# Patient Record
Sex: FEMALE | Race: WHITE | Employment: FULL TIME | URBAN - NONMETROPOLITAN AREA
[De-identification: names, ages, dates, MRNs, and addresses within clinical notes are randomized per-mention and may not be internally consistent; named-entity substitution may affect disease eponyms.]

---

## 2017-08-08 ENCOUNTER — HOSPITAL ENCOUNTER (OUTPATIENT)
Dept: GENERAL RADIOLOGY | Age: 52
Discharge: HOME | End: 2017-08-08
Payer: COMMERCIAL

## 2017-08-08 ENCOUNTER — OFFICE VISIT (OUTPATIENT)
Dept: FAMILY MEDICINE CLINIC | Age: 52
End: 2017-08-08
Payer: COMMERCIAL

## 2017-08-08 VITALS
HEIGHT: 67 IN | WEIGHT: 128 LBS | BODY MASS INDEX: 20.09 KG/M2 | SYSTOLIC BLOOD PRESSURE: 100 MMHG | DIASTOLIC BLOOD PRESSURE: 80 MMHG

## 2017-08-08 DIAGNOSIS — G89.29 CHRONIC RIGHT-SIDED LOW BACK PAIN WITH RIGHT-SIDED SCIATICA: ICD-10-CM

## 2017-08-08 DIAGNOSIS — M54.41 CHRONIC RIGHT-SIDED LOW BACK PAIN WITH RIGHT-SIDED SCIATICA: Primary | ICD-10-CM

## 2017-08-08 DIAGNOSIS — M54.41 CHRONIC RIGHT-SIDED LOW BACK PAIN WITH RIGHT-SIDED SCIATICA: ICD-10-CM

## 2017-08-08 DIAGNOSIS — G89.29 CHRONIC RIGHT-SIDED LOW BACK PAIN WITH RIGHT-SIDED SCIATICA: Primary | ICD-10-CM

## 2017-08-08 PROCEDURE — 99213 OFFICE O/P EST LOW 20 MIN: CPT | Performed by: NURSE PRACTITIONER

## 2017-08-08 PROCEDURE — 72100 X-RAY EXAM L-S SPINE 2/3 VWS: CPT

## 2017-08-08 RX ORDER — METHYLPREDNISOLONE 4 MG/1
TABLET ORAL
Qty: 1 KIT | Refills: 0 | Status: SHIPPED | OUTPATIENT
Start: 2017-08-08 | End: 2017-08-14

## 2017-08-08 ASSESSMENT — ENCOUNTER SYMPTOMS
HEMOPTYSIS: 0
EYES NEGATIVE: 1
GASTROINTESTINAL NEGATIVE: 1
RESPIRATORY NEGATIVE: 1
BOWEL INCONTINENCE: 0
BACK PAIN: 1
COUGH: 0
ORTHOPNEA: 0
SPUTUM PRODUCTION: 0

## 2017-08-08 ASSESSMENT — PATIENT HEALTH QUESTIONNAIRE - PHQ9
2. FEELING DOWN, DEPRESSED OR HOPELESS: 0
SUM OF ALL RESPONSES TO PHQ QUESTIONS 1-9: 0
SUM OF ALL RESPONSES TO PHQ9 QUESTIONS 1 & 2: 0
1. LITTLE INTEREST OR PLEASURE IN DOING THINGS: 0

## 2022-03-17 DIAGNOSIS — D72.820 LYMPHOCYTOSIS: Primary | ICD-10-CM

## 2022-03-18 NOTE — PROGRESS NOTES
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 Hematology consultation is performed 3/21/2022. Patient was seen in urgent care 3/4/2022 for evaluation of sore on left hip. Patient reports not tolerating Bactrim as prescribed. WBC was reported at 26,000. She was prescribed Bactrim, unable to tolerate due to nausea. She was evaluated at [facility]) 3/5/2022 with the following findings:    Labs 3/5/2022:  · CBC: WBC: 17.8, Hgb: 15.3/ MCV: 96, Hct: 43.5, Platelets: 491, Absolute lymphocytes: 10.1, Absolute monocytes: 2.06, Eosinophils: 0.5  · CMP: Creatinine 0.7/GFR 92, total protein 8.5 (6.3-8.2), calcium 10.1  · Amylase: 90  · Lipase: 145, normal  · UA: Negative nitrite, trace blood  · COVID-19 screen: Negative    Noncontrast CT abdomen/pelvis 3/5/2022 at [facility]):  STATRAD:  · 2.5 x 1.8 x 0.7 cm region of skin thickening and subcutaneous stranding in the posterior tissues of the right gluteal region, nonspecific-possibly region of cellulitis  · Approximately 6 x 5 x 3 cm poorly defined hypodense region in the posterior right hepatic lobe by the dome. Possibly fatty infiltrated versus hypodense mass.   Consider follow-up evaluation with contrast-enhanced CT or MRI of liver  · Mild wall thickening throughout the rectum with adjacent fat stranding, suggestive of mild proctitis or colitis  · 2 mm nonobstructive left renal stone  · Scattered, punctate calcified granulomas throughout the liver and spleen    Final read:   · Normal appearance to the liver, stomach and spleen   · multiple calcified granulomas in the spleen    Clarissa was prescribed Bactrim, though reports nausea and unable to complete. She has had epigastric discomfort and complains of her ribs bilaterally \"crackling\". Discomfort is intermittent, not associated with food or position change. There are no alleviating factors. Clarissa states after review of prior medical records/serology, she was noted to have chronic leukocytosis since . Prior CBCs are unavailable and have been requested for review. She is referred for further evaluation. Clarissa denies night sweats, itching or fever. She has had unexplained weight loss of about 13 pounds since May, 2021. She has epigastric discomfort. She was evaluated for similar complaint at [facility] 2021, treated with GI cocktail. She has never had an endoscopy or colonoscopy. CBC at hematology consultation 3/21/2022: WBC 13.9 with ANC 3.4, ALC 10.1. Hgb 14.2/MCV 98.8, platelets 202,553. Possible causes including virus, CLL, others discussed. FLOW cytometry requested along with baseline serology as noted in the assessment/plan. CT scans of the neck, chest, abdomen/pelvis will be requested to evaluate for lymphadenopathy, unexplained weight loss and possible posterior right hepatic lobe mass. Past Medical History:   Diagnosis Date    GERD (gastroesophageal reflux disease)     Hypothyroidism     Rib fracture     accidental fall    Tobacco use     Viral arthritis ([facility] [address]      Past Surgical History:   Procedure Laterality Date     SECTION      3       Current Outpatient Medications:     levothyroxine (SYNTHROID) 75 MCG tablet, Take 75 mcg by mouth daily, Disp: , Rfl:    Allergies:    Allergies   Allergen Reactions    Codeine Hives    Wellbutrin [Bupropion Hcl]      Social History     Tobacco Use    Smoking status: Current Every Day Smoker     Packs/day: 0.50    Smokeless tobacco: Never Used    Tobacco comment: currently 2 cigarettes per day   Substance Use Topics    Alcohol use: No    Drug use: No     Family History   Problem Relation Age of Onset    Lung Cancer Maternal Grandmother      Health Maintenance   Topic Date Due    Hepatitis C screen  Never done    COVID-19 Vaccine (1) Never done    Pneumococcal 0-64 years Vaccine (1 of 2 - PPSV23) Never done    Depression Screen  Never done    HIV screen  Never done    Cervical cancer screen  Never done    Lipid screen  Never done    Colorectal Cancer Screen  Never done    Breast cancer screen  Never done    Shingles Vaccine (1 of 2) Never done    Flu vaccine (1) Never done    DTaP/Tdap/Td vaccine (2 - Td or Tdap) 12/13/2023    Hepatitis A vaccine  Aged Out    Hepatitis B vaccine  Aged Out    Hib vaccine  Aged Out    Meningococcal (ACWY) vaccine  Aged Out     Subjective   Review of Systems   Constitutional: Positive for fatigue and unexpected weight change (loss). Negative for fever. HENT: Negative for dental problem, hearing loss, mouth sores, nosebleeds, sore throat and trouble swallowing. Eyes: Negative for discharge and itching. Respiratory: Negative for cough, shortness of breath and wheezing. Cardiovascular: Positive for palpitations. Negative for chest pain and leg swelling. Gastrointestinal: Positive for abdominal pain. Negative for constipation, diarrhea and vomiting. Loss of appetite, GERD   Endocrine: Positive for cold intolerance. Negative for heat intolerance. Genitourinary: Negative for dysuria, frequency, hematuria and urgency. Musculoskeletal: Positive for arthralgias. Negative for joint swelling and myalgias. Skin: Negative for pallor and rash. Allergic/Immunologic: Negative for environmental allergies and immunocompromised state. Neurological: Negative for seizures, syncope and numbness.    Hematological: Negative for adenopathy. Bruises/bleeds easily. Psychiatric/Behavioral: Negative for agitation, behavioral problems and confusion. The patient is not nervous/anxious. Objective   Physical Exam  Vitals reviewed. Constitutional:       General: She is not in acute distress. Appearance: She is well-developed. She is not toxic-appearing or diaphoretic. Comments: Wearing a facial mask. thin   HENT:      Head: Normocephalic and atraumatic. Right Ear: External ear normal.      Left Ear: External ear normal.      Nose: Nose normal.      Mouth/Throat:      Mouth: Mucous membranes are moist.   Eyes:      General: No scleral icterus. Right eye: No discharge. Left eye: No discharge. Conjunctiva/sclera: Conjunctivae normal.   Neck:      Trachea: No tracheal deviation. Cardiovascular:      Rate and Rhythm: Normal rate and regular rhythm. Pulmonary:      Effort: Pulmonary effort is normal. No respiratory distress. Breath sounds: Normal breath sounds. No wheezing or rales. Chest:   Breasts:      Right: No supraclavicular adenopathy. Left: No supraclavicular adenopathy. Abdominal:      General: Bowel sounds are normal. There is no distension. Palpations: Abdomen is soft. Tenderness: There is abdominal tenderness (LUQ -  mild). There is no guarding. Genitourinary:     Comments: Exam deferred  Musculoskeletal:         General: No tenderness or deformity. Cervical back: Neck supple. No muscular tenderness. Comments: Normal ROM all four extremities   Lymphadenopathy:      Cervical:      Right cervical: No superficial or deep cervical adenopathy. Left cervical: No superficial or deep cervical adenopathy. Upper Body:      Right upper body: No supraclavicular adenopathy. Left upper body: No supraclavicular adenopathy. Comments:      Skin:     General: Skin is warm and dry. Findings: No rash.    Neurological:      Mental Status: She is alert and oriented to person, place, and time. Comments: follows commands, non-focal   Psychiatric:         Behavior: Behavior normal. Behavior is cooperative. Thought Content: Thought content normal.         Judgment: Judgment normal.      Comments: Alert and oriented to person, place and time. BP (!) 142/82   Pulse 84   Ht 5' 7\" (1.702 m)   Wt 119 lb 14.4 oz (54.4 kg)   SpO2 97%   BMI 18.78 kg/m²   Wt Readings from Last 3 Encounters:   03/21/22 119 lb 14.4 oz (54.4 kg)   08/08/17 128 lb (58.1 kg)   09/08/16 132 lb (59.9 kg)     Labs reviewed by me:  CBC 03/21/22:   Lab Results   Component Value Date    WBC 13.90 (H) 03/21/2022    HGB 14.2 03/21/2022    HCT 41.6 03/21/2022    MCV 98.8 (H) 03/21/2022     (L) 03/21/2022    RBC 4.21 03/21/2022    MCH 33.7 (H) 03/21/2022    MCHC 34.1 03/21/2022    RDW 12.2 03/21/2022     ASSESSMENT/PLAN:    1. Lymphocytosis    2. Unexplained weight loss    3. Abnormal finding on imaging of liver    4. Elevated total protein    5. Tobacco use    6. Care plan discussed with patient      CBC consistent with leukocytosis with lymphocytosis. No palpable peripheral adenopathies. Recent CT abdomen/pelvis with possible posterior right hepatic lobe mass. Unexplained weight loss. FLOW cytometry requested in addition to serology as noted below. CT soft tissue neck, chest, abdomen/pelvis to evaluate for lymphadenopathy and possible liver mass. Check SPEP/QI/serum light chains regarding elevated total protein. Plan of care discussed with Usha Flores. Possible bone marrow aspirate and biopsy pending FLOW cytometry results. Will likely need endoscopic evaluation for GERD/abnormal weight loss. She has not had a screening colonoscopy and is overdue for this as well. Discussed referral pending work-up for leukocytosis. Discussed and recommend complete smoking cessation. Currently smoking 2 cigarettes/day, down from 1 pack/day.     Orders Placed This Encounter Procedures    CT SOFT TISSUE NECK W CONTRAST    CT CHEST W CONTRAST    CT ABDOMEN PELVIS W IV CONTRAST Additional Contrast? Radiologist Recommendation    Miscellaneous Sendout    C-Reactive Protein    Sedimentation Rate    Lactate Dehydrogenase    MONOCLONAL PROTEIN AND FLC, SERUM    HIV Rapid 1&2    Hepatitis Panel, Acute     Return in about 10 days (around 3/31/2022) for follow up with MELO Carnes. I have seen, examined and reviewed this patient medication list, appropriate labs and imaging studies. I reviewed relevant medical records and others physicians notes. I discussed the plan of care with the patient. I answered all questions to the patients satisfaction. I have also reviewed the chief complaint (CC) and part of the history (History of Present Illness (HPI), Past Family Social History [facility]), or Review of Systems (ROS) and made changes when appropriated. Medical records from Dr. Marianna Jean, labs and recent CT abdomen/pelvis reviewed. Please send a copy of patient's office note from today to Dr. Marianna Jean please send a copy of today's office note to Dr. Marianna Jean. Dictated utilizing Dragon transcription software.         MELO Wallis  3:39 PM  3/21/2022

## 2022-03-21 ENCOUNTER — HOSPITAL ENCOUNTER (OUTPATIENT)
Dept: INFUSION THERAPY | Age: 57
Discharge: HOME | End: 2022-03-21
Payer: COMMERCIAL

## 2022-03-21 ENCOUNTER — OFFICE VISIT (OUTPATIENT)
Dept: HEMATOLOGY | Age: 57
End: 2022-03-21
Payer: COMMERCIAL

## 2022-03-21 VITALS
SYSTOLIC BLOOD PRESSURE: 142 MMHG | WEIGHT: 119.9 LBS | OXYGEN SATURATION: 97 % | BODY MASS INDEX: 18.82 KG/M2 | HEART RATE: 84 BPM | DIASTOLIC BLOOD PRESSURE: 82 MMHG | HEIGHT: 67 IN

## 2022-03-21 DIAGNOSIS — Z72.0 TOBACCO USE: ICD-10-CM

## 2022-03-21 DIAGNOSIS — D72.820 LYMPHOCYTOSIS: ICD-10-CM

## 2022-03-21 DIAGNOSIS — Z71.89 CARE PLAN DISCUSSED WITH PATIENT: ICD-10-CM

## 2022-03-21 DIAGNOSIS — R77.8 ELEVATED TOTAL PROTEIN: ICD-10-CM

## 2022-03-21 DIAGNOSIS — R93.2 ABNORMAL FINDING ON IMAGING OF LIVER: ICD-10-CM

## 2022-03-21 DIAGNOSIS — R63.4 UNEXPLAINED WEIGHT LOSS: ICD-10-CM

## 2022-03-21 DIAGNOSIS — D72.820 LYMPHOCYTOSIS: Primary | ICD-10-CM

## 2022-03-21 LAB
C-REACTIVE PROTEIN: <0 MG/DL (ref 0–1)
HAV IGM SER IA-ACNC: NORMAL
HCT VFR BLD CALC: 41.6 % (ref 34.1–44.9)
HEMOGLOBIN: 14.2 G/DL (ref 11.2–15.7)
HEPATITIS B CORE IGM ANTIBODY: NORMAL
HEPATITIS B SURFACE ANTIGEN INTERPRETATION: NORMAL
HEPATITIS C ANTIBODY INTERPRETATION: NORMAL
HIV-1 P24 AG: NORMAL
LACTATE DEHYDROGENASE: 473 U/L (ref 313–618)
MCH RBC QN AUTO: 33.7 PG (ref 25.6–32.2)
MCHC RBC AUTO-ENTMCNC: 34.1 G/DL (ref 32.3–35.5)
MCV RBC AUTO: 98.8 FL (ref 79.4–94.8)
PDW BLD-RTO: 12.2 % (ref 11.7–14.4)
PLATELET # BLD: 163 K/UL (ref 182–369)
PMV BLD AUTO: 8.8 FL (ref 7.4–10.4)
RAPID HIV 1&2: NORMAL
RBC # BLD: 4.21 M/UL (ref 3.93–5.22)
SEDIMENTATION RATE, ERYTHROCYTE: 3 MM/HR (ref 0–25)
WBC # BLD: 13.9 K/UL (ref 3.98–10.04)

## 2022-03-21 PROCEDURE — 36415 COLL VENOUS BLD VENIPUNCTURE: CPT

## 2022-03-21 PROCEDURE — 85027 COMPLETE CBC AUTOMATED: CPT

## 2022-03-21 PROCEDURE — 99204 OFFICE O/P NEW MOD 45 MIN: CPT | Performed by: NURSE PRACTITIONER

## 2022-03-21 PROCEDURE — 83615 LACTATE (LD) (LDH) ENZYME: CPT

## 2022-03-21 PROCEDURE — 99203 OFFICE O/P NEW LOW 30 MIN: CPT

## 2022-03-21 RX ORDER — LEVOTHYROXINE SODIUM 0.07 MG/1
75 TABLET ORAL DAILY
COMMUNITY
Start: 2022-03-08

## 2022-03-21 ASSESSMENT — ENCOUNTER SYMPTOMS
COUGH: 0
SHORTNESS OF BREATH: 0
CONSTIPATION: 0
VOMITING: 0
EYE ITCHING: 0
ABDOMINAL PAIN: 1
DIARRHEA: 0
EYE DISCHARGE: 0
TROUBLE SWALLOWING: 0
WHEEZING: 0
SORE THROAT: 0

## 2022-03-25 LAB
+IMM: NORMAL
ALBUMIN SERPL-MCNC: 5 G/DL (ref 3.75–5.01)
ALPHA-1-GLOBULIN: 0.33 G/DL (ref 0.19–0.46)
ALPHA-2-GLOBULIN: 0.56 G/DL (ref 0.48–1.05)
BETA GLOBULIN: 0.75 G/DL (ref 0.48–1.1)
GAMMA GLOBULIN: 1.06 G/DL (ref 0.62–1.51)
IGA: 114 MG/DL (ref 68–408)
IGG: 866 MG/DL (ref 768–1632)
IGM: 128 MG/DL (ref 35–263)
KAPPA FREE LIGHT CHAINS QNT: 17.18 MG/L (ref 3.3–19.4)
KAPPA/LAMBDA FREE LIGHT CHAIN RATIO: 1.11 (ref 0.26–1.65)
LAMBDA FREE LIGHT CHAINS QNT: 15.51 MG/L (ref 5.71–26.3)
SPE/IFE INTERPRETATION: NORMAL
TOTAL PROTEIN: 7.7 G/DL (ref 6.3–8.2)

## 2022-03-29 ENCOUNTER — HOSPITAL ENCOUNTER (OUTPATIENT)
Dept: CT IMAGING | Age: 57
Discharge: HOME | End: 2022-03-29
Payer: COMMERCIAL

## 2022-03-29 DIAGNOSIS — R63.4 UNEXPLAINED WEIGHT LOSS: ICD-10-CM

## 2022-03-29 DIAGNOSIS — D72.820 LYMPHOCYTOSIS: ICD-10-CM

## 2022-03-29 PROCEDURE — 70491 CT SOFT TISSUE NECK W/DYE: CPT

## 2022-03-29 PROCEDURE — 71260 CT THORAX DX C+: CPT

## 2022-03-29 PROCEDURE — 74177 CT ABD & PELVIS W/CONTRAST: CPT

## 2022-03-29 PROCEDURE — 6360000004 HC RX CONTRAST MEDICATION: Performed by: NURSE PRACTITIONER

## 2022-03-29 RX ADMIN — IOPAMIDOL 100 ML: 755 INJECTION, SOLUTION INTRAVENOUS at 14:04

## 2022-03-30 NOTE — PROGRESS NOTES
OP HEMATOLOGY/ONCOLOGY PROGRESS NOTE      Pt Name: Aletha Duarte  YOB: 1965  MRN: [id number]  PCP: MELO Arita  Date of evaluation: 3/31/2022    History Obtained From:  patient, electronic medical record    CHIEF COMPLAINT:    Chief Complaint   Patient presents with    Follow-up     Lymphocytosis     HISTORY OF PRESENT ILLNESS:    Aletha Duarte is a 62 y.o.  female returning to the clinic to discuss serology and imaging studies for lymphocytosis and unexplained weight loss. She denies new developments since last evaluated. Her weight has improved by few pounds. She denies night sweats/itching/fever. CBC today includes a WBC of 15.6 with ALC 10.5. Work-up revealed CLL. HEMATOLOGY HISTORY: CLL/SLL, 3/21/2022  Connie Mercado was seen in hematology consultation 3/21/2022, referred by MELO Arita for evaluation of lymphocytosis. Patient was seen in urgent care 3/4/2022 for evaluation of sore on left hip. Patient reports not tolerating Bactrim as prescribed. WBC was reported at 26,000. She was prescribed Bactrim, unable to tolerate due to nausea. She was evaluated at [facility]) 3/5/2022 with the following findings:    Labs 3/5/2022:  · CBC: WBC: 17.8, Hgb: 15.3/ MCV: 96, Hct: 43.5, Platelets: 859, Absolute lymphocytes: 10.1, Absolute monocytes: 2.06, Eosinophils: 0.5  · CMP: Creatinine 0.7/GFR 92, total protein 8.5 (6.3-8.2), calcium 10.1  · Amylase: 90  · Lipase: 145, normal  · UA: Negative nitrite, trace blood  · COVID-19 screen: Negative    Noncontrast CT abdomen/pelvis 3/5/2022 at [facility]):  STATRAD:  · 2.5 x 1.8 x 0.7 cm region of skin thickening and subcutaneous stranding in the posterior tissues of the right gluteal region, nonspecific-possibly region of cellulitis  · Approximately 6 x 5 x 3 cm poorly defined hypodense region in the posterior right hepatic lobe by the dome. Possibly fatty infiltrated versus hypodense mass.   Consider follow-up evaluation with contrast-enhanced CT or MRI of liver  · Mild wall thickening throughout the rectum with adjacent fat stranding, suggestive of mild proctitis or colitis  · 2 mm nonobstructive left renal stone  · Scattered, punctate calcified granulomas throughout the liver and spleen    Final read:   · Normal appearance to the liver, stomach and spleen   · multiple calcified granulomas in the spleen    Wallace Monroe was prescribed Bactrim, though reports nausea and unable to complete. She has had epigastric discomfort and complains of her ribs bilaterally \"crackling\". Discomfort is intermittent, not associated with food or position change. There are no alleviating factors. Wallace Monroe states after review of prior medical records/serology, she was noted to have chronic leukocytosis since 2017. Prior CBCs are unavailable and have been requested for review. She is referred for further evaluation. Wallace Monroe denies night sweats, itching or fever. She has had unexplained weight loss of about 13 pounds since May, 2021. She has epigastric discomfort. She was evaluated for similar complaint at [facility] ER 2021, treated with GI cocktail. She has never had an endoscopy or colonoscopy. Labs 3/21/2022:  · FLOW cytometry 3/21/2022 revealed CD5 positive monoclonal B-cell population consistent with B-cell chronic lymphocytic leukemia/small lymphocytic lymphoma  · CBC: WBC 13.9 with ANC 3.4, ALC 10.1. Hgb 14.2/MCV 98.8, platelets 360,344. · Hepatitis Panel: non-reactive  · HIV: non-reactive  · SPEP: normal spep pattern  · Immunofixation: No monoclonal proteins seen  · Ig, IgA: 114, IgM: 128  · Kappa light chain: 17.18, Lambda light chain: 15.51, K/L ratio: 1.11  · Sed rate: 3  · CRP: <0  · LDH: 473    CT Soft Tissue neck with contrast 3/29/2022 at [facility]:   · No suspicious adenopathy in the neck.      CT Chest with contrast 3/29/2022 at [facility]:   · No pathologic intrathoracic or axillary lymphadenopathy. · Granulomatous calcification. Linear left upper lobe scarring with no acute consolidation or suspicious nodularity. CT Abdomen pelvis with contrast 3/29/2022 at [facility]:   · No evidence of abdominal or pelvic lymphadenopathy. · A low-density, nodular enhancing mass in the liver which may represent an atypical hemangioma. Further evaluation with CT scan of the abdomen with liver protocol or MR imaging of the abdomen may be obtained. large low-density mass in the dome of the right lobe posteriorly measuring 4.6 x 5.6 x 4.1 cm which showed nodular interrupted contrast enhancement. · Moderately dilated and tortuous gallbladder seen. No gallstones (patient denied abdominal pain, bloating, nausea at f/u on 3/31/2022)  · No splenomegaly     Recommend bone marrow aspirate and biopsy. Shayna Marques will likely not need treatment, observation only. Final decisions pending BMAB. Past Medical History:   Diagnosis Date    GERD (gastroesophageal reflux disease)     Hypothyroidism     Rib fracture     accidental fall    Tobacco use     Viral arthritis ([facility] [address]      Past Surgical History:   Procedure Laterality Date     SECTION      3       Current Outpatient Medications:     levothyroxine (SYNTHROID) 75 MCG tablet, Take 75 mcg by mouth daily, Disp: , Rfl:    Allergies:    Allergies   Allergen Reactions    Codeine Hives    Wellbutrin [Bupropion Hcl]      Social History     Tobacco Use    Smoking status: Current Every Day Smoker     Packs/day: 0.50    Smokeless tobacco: Never Used    Tobacco comment: currently 2 cigarettes per day   Substance Use Topics    Alcohol use: No    Drug use: No     Family History   Problem Relation Age of Onset    Lung Cancer Maternal Grandmother      Health Maintenance   Topic Date Due    Pneumococcal 0-64 years Vaccine (1 of 2 - PPSV23) Never done    Depression Screen  Never done    Cervical cancer screen  Never done    Lipid screen  Never done   [facility] Colorectal Cancer Screen  Never done    Breast cancer screen  Never done    Shingles Vaccine (1 of 2) Never done    COVID-19 Vaccine (3 - Booster for Moderna series) 08/24/2021    Flu vaccine (1) Never done    DTaP/Tdap/Td vaccine (2 - Td or Tdap) 12/13/2023    Hepatitis C screen  Completed    HIV screen  Completed    Hepatitis A vaccine  Aged Out    Hepatitis B vaccine  Aged Out    Hib vaccine  Aged Out    Meningococcal (ACWY) vaccine  Aged Out     Subjective   Review of Systems   Constitutional: Negative for fatigue and fever. HENT: Negative for dental problem, hearing loss, mouth sores, nosebleeds, sore throat and trouble swallowing. Eyes: Negative for discharge and itching. Respiratory: Negative for cough, shortness of breath and wheezing. Cardiovascular: Negative for chest pain, palpitations and leg swelling. Gastrointestinal: Negative for abdominal pain, constipation, diarrhea, nausea and vomiting. Endocrine: Negative for cold intolerance and heat intolerance. Genitourinary: Negative for dysuria, frequency, hematuria and urgency. Musculoskeletal: Negative for arthralgias, joint swelling and myalgias. Skin: Negative for pallor and rash. Allergic/Immunologic: Negative for environmental allergies and immunocompromised state. Neurological: Negative for seizures, syncope and numbness. Hematological: Negative for adenopathy. Does not bruise/bleed easily. Psychiatric/Behavioral: Negative for agitation, behavioral problems and confusion. The patient is not nervous/anxious. Objective   Physical Exam  Vitals reviewed. Constitutional:       General: She is not in acute distress. Appearance: She is well-developed. She is not toxic-appearing or diaphoretic. Comments: Wearing a facial mask. thin   HENT:      Head: Normocephalic and atraumatic.       Right Ear: External ear normal.      Left Ear: External ear normal.      Nose: Nose normal.      Mouth/Throat:      Mouth: Mucous membranes are moist.   Eyes:      General: No scleral icterus. Right eye: No discharge. Left eye: No discharge. Conjunctiva/sclera: Conjunctivae normal.   Neck:      Trachea: No tracheal deviation. Cardiovascular:      Rate and Rhythm: Normal rate and regular rhythm. Pulmonary:      Effort: Pulmonary effort is normal. No respiratory distress. Breath sounds: Normal breath sounds. No wheezing or rales. Chest:   Breasts:      Right: No axillary adenopathy or supraclavicular adenopathy. Left: No axillary adenopathy or supraclavicular adenopathy. Abdominal:      General: Bowel sounds are normal. There is no distension. Palpations: Abdomen is soft. Tenderness: There is no guarding. Genitourinary:     Comments: Exam deferred  Musculoskeletal:         General: No tenderness or deformity. Cervical back: Neck supple. No muscular tenderness. Comments: Normal ROM all four extremities   Lymphadenopathy:      Cervical:      Right cervical: No superficial or deep cervical adenopathy. Left cervical: No superficial or deep cervical adenopathy. Upper Body:      Right upper body: No supraclavicular or axillary adenopathy. Left upper body: No supraclavicular or axillary adenopathy. Lower Body: No right inguinal adenopathy. No left inguinal adenopathy. Comments:      Skin:     General: Skin is warm and dry. Findings: No rash. Neurological:      Mental Status: She is alert and oriented to person, place, and time. Comments: follows commands, non-focal   Psychiatric:         Behavior: Behavior normal. Behavior is cooperative. Thought Content: Thought content normal.         Judgment: Judgment normal.      Comments: Alert and oriented to person, place and time.        BP (!) 142/84   Pulse 89   Wt 121 lb 12.8 oz (55.2 kg)   SpO2 98%   BMI 19.08 kg/m²   Wt Readings from Last 3 Encounters:   03/31/22 121 lb 12.8 oz (55.2 kg) 22 119 lb 14.4 oz (54.4 kg)   17 128 lb (58.1 kg)     Labs reviewed by me:  CBC 22:   Lab Results   Component Value Date    WBC 15.60 (H) 2022    HGB 14.7 2022    HCT 43.0 2022    MCV 99.3 (H) 2022     (L) 2022    RBC 4.33 2022    MCH 33.9 (H) 2022    MCHC 34.2 2022    RDW 12.7 2022       ASSESSMENT/PLAN:    1. CLL (chronic lymphocytic leukemia) (HCC)    2. Lymphocytosis    3. Abnormal finding on imaging of liver    4. Unexplained weight loss    5. Care plan discussed with patient       CLL/SLL    Labs 3/21/2022  · Hepatitis Panel: non-reactive  · HIV: non-reactive  · SPEP: normal spep pattern  · Immunofixation: No monoclonal proteins seen  · Ig, IgA: 114, IgM: 128  · Kappa light chain: 17.18, Lambda light chain: 15.51, K/L ratio: 1.11  · Sed rate: 3  · CRP: <0  · LDH: 473    CT Soft Tissue neck with contrast 3/29/2022 at [facility]:   · No suspicious adenopathy in the neck. CT Chest with contrast 3/29/2022 at [facility]:   · No pathologic intrathoracic or axillary lymphadenopathy. · Granulomatous calcification. Linear left upper lobe scarring with no acute consolidation or suspicious nodularity. CT Abdomen pelvis with contrast 3/29/2022 at [facility]:   · No evidence of abdominal or pelvic lymphadenopathy. · A low-density, nodular enhancing mass in the liver which may represent an atypical hemangioma. Further evaluation with CT scan of the abdomen with liver protocol or MR imaging of the abdomen may be obtained. large low-density mass in the dome of the right lobe posteriorly measuring 4.6 x 5.6 x 4.1 cm which showed nodular interrupted contrast enhancement. · Moderately dilated and tortuous gallbladder seen.  No gallstones (patient denies abdominal pain, bloating, nausea at this time)    FLOW cytometry 3/21/2022 revealed CD5 positive monoclonal B-cell population consistent with B-cell chronic lymphocytic leukemia/small lymphocytic lymphoma    Leukocytosis is mild. She has no B symptoms. CT scans soft tissue neck, chest, abdomen/pelvis are negative for lymphadenopathy. There is no splenomegaly. Recommend bone marrow aspirate and biopsy to evaluate bone marrow involvement, likely will not need treatment, observation only. Findings discussed with Ling Henriquez. She is agreeable to bone marrow aspirate and biopsy, though will give consideration to having completed in clinic versus [facility] with MAC. She will notify the clinic of her decision and arrangements will be made. Abnormal finding on imaging of the liver  large low-density mass in the dome of the right lobe posteriorly measuring 4.6 x 5.6 x 4.1 cm which showed nodular interrupted contrast enhancement noted on CT abd/pelvis  MRI abdomen, attention liver, requested for further clarification    Unexplained weight loss  Weight improved  Wt Readings from Last 3 Encounters:   03/31/22 121 lb 12.8 oz (55.2 kg)   03/21/22 119 lb 14.4 oz (54.4 kg)   08/08/17 128 lb (58.1 kg)   Imaging studies as noted above  Continue to monitor    Orders Placed This Encounter   Procedures    MRI ABDOMEN W WO CONTRAST     Return in about 4 months (around 7/31/2022) for follow up with MELO Sommer. I have seen, examined and reviewed this patient medication list, appropriate labs and imaging studies. I reviewed relevant medical records and others physicians notes. I discussed the plan of care with the patient. I answered all questions to the patients satisfaction. I have also reviewed the chief complaint (CC) and part of the history (History of Present Illness (HPI), Past Family Social History [facility]), or Review of Systems (ROS) and made changes when appropriated. Dictated utilizing Dragon transcription software.           MELO Bustos  4:03 PM  3/31/2022

## 2022-03-31 ENCOUNTER — OFFICE VISIT (OUTPATIENT)
Dept: HEMATOLOGY | Age: 57
End: 2022-03-31
Payer: COMMERCIAL

## 2022-03-31 ENCOUNTER — HOSPITAL ENCOUNTER (OUTPATIENT)
Dept: INFUSION THERAPY | Age: 57
Discharge: HOME | End: 2022-03-31
Payer: COMMERCIAL

## 2022-03-31 VITALS
HEART RATE: 89 BPM | SYSTOLIC BLOOD PRESSURE: 142 MMHG | OXYGEN SATURATION: 98 % | DIASTOLIC BLOOD PRESSURE: 84 MMHG | BODY MASS INDEX: 19.08 KG/M2 | WEIGHT: 121.8 LBS

## 2022-03-31 DIAGNOSIS — Z71.89 CARE PLAN DISCUSSED WITH PATIENT: ICD-10-CM

## 2022-03-31 DIAGNOSIS — R63.4 UNEXPLAINED WEIGHT LOSS: ICD-10-CM

## 2022-03-31 DIAGNOSIS — D72.820 LYMPHOCYTOSIS: ICD-10-CM

## 2022-03-31 DIAGNOSIS — C91.10 CLL (CHRONIC LYMPHOCYTIC LEUKEMIA) (HCC): Primary | ICD-10-CM

## 2022-03-31 DIAGNOSIS — R93.2 ABNORMAL FINDING ON IMAGING OF LIVER: ICD-10-CM

## 2022-03-31 LAB
HCT VFR BLD CALC: 43 % (ref 34.1–44.9)
HEMOGLOBIN: 14.7 G/DL (ref 11.2–15.7)
MCH RBC QN AUTO: 33.9 PG (ref 25.6–32.2)
MCHC RBC AUTO-ENTMCNC: 34.2 G/DL (ref 32.3–35.5)
MCV RBC AUTO: 99.3 FL (ref 79.4–94.8)
PDW BLD-RTO: 12.7 % (ref 11.7–14.4)
PLATELET # BLD: 165 K/UL (ref 182–369)
PMV BLD AUTO: 8.9 FL (ref 7.4–10.4)
RBC # BLD: 4.33 M/UL (ref 3.93–5.22)
WBC # BLD: 15.6 K/UL (ref 3.98–10.04)

## 2022-03-31 PROCEDURE — 36415 COLL VENOUS BLD VENIPUNCTURE: CPT

## 2022-03-31 PROCEDURE — 85027 COMPLETE CBC AUTOMATED: CPT

## 2022-03-31 PROCEDURE — 99214 OFFICE O/P EST MOD 30 MIN: CPT | Performed by: NURSE PRACTITIONER

## 2022-03-31 PROCEDURE — 99212 OFFICE O/P EST SF 10 MIN: CPT

## 2022-03-31 ASSESSMENT — ENCOUNTER SYMPTOMS
SORE THROAT: 0
WHEEZING: 0
EYE ITCHING: 0
TROUBLE SWALLOWING: 0
NAUSEA: 0
EYE DISCHARGE: 0
COUGH: 0
SHORTNESS OF BREATH: 0
CONSTIPATION: 0
VOMITING: 0
ABDOMINAL PAIN: 0
DIARRHEA: 0

## 2022-03-31 NOTE — PATIENT INSTRUCTIONS
Patient Education        Chronic Lymphocytic Leukemia: Care Instructions  Your Care Instructions     Leukemia is a type of cancer that causes your body to make too many blood cells, especially white blood cells. White blood cells are a part of yourimmune system, which helps protect you from infection and disease. In chronic lymphocytic leukemia (CLL), your body makes large numbers of white blood cells called lymphocytes. The cells may work as they should, but your body makes too many of them. Over time, these cells may not work as well, and they may cause symptoms as they begin to crowd out healthy white blood cellsand other parts of your blood. There are several treatments for CLL, including chemotherapy, targeted therapy, or radiation therapy. But because CLL may get worse slowly, sometimes treatment can wait. Your doctor will follow your progress and let you know if or when youneed treatment. When you find out that you have cancer, you may feel many emotions and may need some help coping. Seek out family, friends, and counselors for support. You also can do things at home to make yourself feel better while you go through treatment. Call the [address] [place], [place] [address]jorgito Ramirez ([phone]) or visit itswebsite at [address] [facility]. [employer] for more information. Follow-up care is a key part of your treatment and safety. Be sure to make and go to all appointments, and call your doctor if you are having problems. It's also a good idea to know your test results and keep alist of the medicines you take. How can you care for yourself at home?  You may get medicine for nausea, vomiting, or pain (although leukemia rarely causes pain). Take your medicines exactly as prescribed. Call your doctor if you think you are having a problem with your medicine. You will get more details on the specific medicines your doctor prescribes.  Eat healthy food.  If you do not feel like eating, try to eat food that has protein and extra calories to keep up your strength and prevent weight loss. Drink liquid meal replacements for extra calories and protein. Try to eat your main meal early.  Get some physical activity every day, but do not get too tired. Keep doing the hobbies you enjoy as your energy allows.  Take steps to control your stress and workload. Learn relaxation techniques. ? Share your feelings. Stress and tension affect our emotions. By expressing your feelings to others, you may be able to understand and cope with them. ? Consider joining a support group. Talking about a problem with your spouse, a good friend, or other people with similar problems is a good way to reduce tension and stress. ? Express yourself through art. Try writing, dance, art, or crafts to relieve tension. Some dance, writing, or art groups may be available just for people who have cancer. ? Be kind to your body and mind. Getting enough sleep, eating a nutritious diet, and taking time to do things you enjoy can contribute to an overall feeling of balance in your life and help reduce stress. ? Get help if you need it. Discuss your concerns with your doctor or counselor.  If you are vomiting or have diarrhea:  ? Drink plenty of fluids to prevent dehydration. Choose water and other clear liquids. If you have kidney, heart, or liver disease and have to limit fluids, talk with your doctor before you increase the amount of fluids you drink. ? When you are able to eat, try clear soups, mild foods, and liquids until all symptoms are gone for 12 to 48 hours. Other good choices include dry toast, crackers, cooked cereal, and gelatin dessert, such as Jell-O.   Avoid colds and flu. Get a pneumococcal vaccine shot. If you have had one before, ask your doctor whether you need another dose. Get a flu shot every year. If you must be around people with colds or flu, wash your hands often.  Do not smoke.  If you need help quitting, talk to your doctor about stop-smoking programs and medicines. These can increase your chances of quitting for good. When should you call for help? Call 911 anytime you think you may need emergency care. For example, call if:     You passed out (lost consciousness). Call your doctor now or seek immediate medical care if:     You have a fever.      You have abnormal bleeding.      You think you have an infection.      You have new or worse pain.      You have new symptoms, such as a cough, belly pain, vomiting, diarrhea, or a rash. Watch closely for changes in your health, and be sure to contact your doctor if:     You are much more tired than usual.      You have swollen glands in your armpits, groin, or neck.      You do not get better as expected. Where can you learn more? Go to https://"[employer]SmartNews. org and sign in to your [employer] account. Enter F964 in the canvs.co box to learn more about \"Chronic Lymphocytic Leukemia: Care Instructions. \"     If you do not have an account, please click on the \"Sign Up Now\" link. Current as of: September 8, 2021               Content Version: 13.2  © 7527-2122 [employer]. Care instructions adapted under license by [facility] ([facility]). If you have questions about a medical condition or this instruction, always ask your healthcare professional. [address] any warranty or liability for your use of this information. Patient Education        Bone Marrow Aspiration and Biopsy: Before Your Procedure  What is bone marrow aspiration and biopsy? Bone marrow aspiration is a procedure that takes out a small amount of bone marrow fluid through a needle. Bone marrow biopsy uses a needle to take out a small amount of bone with the marrow inside it. These samples are then checked under a microscope. The hip bone is the most often used area for theseprocedures. Aspiration and biopsy are often done to find a blood problem or an infection.  They also may be used to find out if a cancer has spread to the bone marrow. The procedure can also be done to collect bone marrow for medical procedures,such as stem cell transplant or chromosomal analysis. You may get medicine to help you relax before the procedure. The doctor will inject numbing medicine in the skin over your bone. He or she will put a needle through your skin and into your bone to reach the bone marrow. You may feel pressure or some dull pain during the procedure. After the doctor takes the sample, he or she will remove the needle. The doctor may need to take more than one sample. This can come from the same spot or from a different area on your body. When the procedure is done, the doctor or a nurse will put pressure onthe area to stop any bleeding. What happens on the day of the procedure? At the hospital or doctor's office   A doctor or nurse will give you medicine to numb the area where the needle will go. You may feel pain and hear a crunching sound when the needle enters your bone. This usually lasts only a few seconds. But you may have some discomfort during the procedure.  The procedure will take about 20 to 30 minutes.  You will have a bandage over the area where the doctor put the needle. When should you call your doctor?  You have questions or concerns.      You don't understand how to prepare for your procedure.      You become ill before the procedure (such as fever, flu, or a cold).      You need to reschedule or have changed your mind about having the procedure. Where can you learn more? Go to https://[employer]toolshayderewtc.[employer]. org and sign in to your [employer] account. Enter Y520 in the [facility] box to learn more about \"Bone Marrow Aspiration and Biopsy: Before Your Procedure. \"     If you do not have an account, please click on the \"Sign Up Now\" link.   Current as of: September 8, 2021               Content Version: 13.2  © 6485-4957 [employer]. Care instructions adapted under license by [facility] ([facility]). If you have questions about a medical condition or this instruction, always ask your healthcare professional. [address] any warranty or liability for your use of this information.

## 2022-04-29 ENCOUNTER — PROCEDURE VISIT (OUTPATIENT)
Dept: ENT CLINIC | Age: 57
End: 2022-04-29
Payer: COMMERCIAL

## 2022-04-29 ENCOUNTER — OFFICE VISIT (OUTPATIENT)
Dept: ENT CLINIC | Age: 57
End: 2022-04-29
Payer: COMMERCIAL

## 2022-04-29 VITALS
DIASTOLIC BLOOD PRESSURE: 76 MMHG | SYSTOLIC BLOOD PRESSURE: 112 MMHG | HEIGHT: 67 IN | BODY MASS INDEX: 18.99 KG/M2 | WEIGHT: 121 LBS

## 2022-04-29 DIAGNOSIS — H93.13 TINNITUS OF BOTH EARS: Primary | ICD-10-CM

## 2022-04-29 DIAGNOSIS — H93.A2 PULSATILE TINNITUS OF LEFT EAR: Primary | ICD-10-CM

## 2022-04-29 PROCEDURE — 92567 TYMPANOMETRY: CPT | Performed by: AUDIOLOGIST

## 2022-04-29 PROCEDURE — 99203 OFFICE O/P NEW LOW 30 MIN: CPT | Performed by: PHYSICIAN ASSISTANT

## 2022-04-29 PROCEDURE — 92553 AUDIOMETRY AIR & BONE: CPT | Performed by: AUDIOLOGIST

## 2022-04-29 RX ORDER — LISINOPRIL 10 MG/1
TABLET ORAL
COMMUNITY
Start: 2022-04-05 | End: 2022-04-29 | Stop reason: ALTCHOICE

## 2022-04-29 ASSESSMENT — ENCOUNTER SYMPTOMS
FACIAL SWELLING: 0
RHINORRHEA: 0
EYE DISCHARGE: 0
VOICE CHANGE: 0
SINUS PRESSURE: 0
EYE PAIN: 0
TROUBLE SWALLOWING: 0
SINUS PAIN: 0
SORE THROAT: 0

## 2022-04-29 NOTE — PROGRESS NOTES
History   Vanessa Toussaint is a 62 y.o. female who presented to the clinic this date with complaints of bilateral tinnitus. She reported onset 5 weeks ago. She denied changes in hearing and aural fullness. She denied changes in health history and changes in medication. Summary   Results obtained today are consistent with normal TM mobility and normal hearing bilaterally. Results   Otoscopy:    Right: Clear EAC/Normal TM   Left: Clear EAC/Normal TM    Audiometry:    Right: Hearing WNL     Left: Hearing WNL           Tympanometry:     Right: Type A   Left: Type A    Plan   Results of today's testing were discussed with Ms. Greg Loco and the following recommendations were made:    1. Follow up with ENT as scheduled.         Audiogram and Acoustic Immittance

## 2022-04-29 NOTE — ASSESSMENT & PLAN NOTE
New onset of pulsatile tinnitus to the left ear with vision changes with history of chronic lymphocytic leukemia  Plan: I have recommended MRI of the left IAC for further evaluation. I will also give her samples of Lipo flavonoid for the tinnitus. If the MRI is negative, would recommend a prednisone trial with a Medrol Dosepak. Also could consider potential psychotherapy due to apparent anxiety related to the tinnitus.

## 2022-04-29 NOTE — PROGRESS NOTES
[facility]/ENT  Ms. Magdalena Henson is a pleasant 72-year-old  female that was referred by Marilu Villafana due to problems with tinnitus. She reported that this was noted about 5 weeks ago after awaking from sleep. She denies any loud noise exposures or any traumatic injury. She reports that this is very prominent to the left ear and has been preventing her from getting good rest.  She denies a family history of premature hearing loss. Patient also denies a history of migraines. She does report of noticing ocular floaters which has been very prevalent since the onset of the tinnitus. Audiology studies were completed today and reviewed with the patient. Patient was noted with no deficit bilaterally with tympanogram noted to be type A bilaterally. Allergies: Cephalexin, Codeine, Ibuprofen, Promethazine, and Wellbutrin [bupropion hcl]      Current Outpatient Medications   Medication Sig Dispense Refill    levothyroxine (SYNTHROID) 75 MCG tablet Take 75 mcg by mouth daily       No current facility-administered medications for this visit. Past Surgical History:   Procedure Laterality Date     SECTION      3       Past Medical History:   Diagnosis Date    GERD (gastroesophageal reflux disease)     Hypothyroidism     Rib fracture     accidental fall    Tobacco use     Viral arthritis ([facility] [address] 2017       Family History   Problem Relation Age of Onset    Lung Cancer Maternal Grandmother        Social History     Tobacco Use    Smoking status: Current Every Day Smoker     Packs/day: 0.50    Smokeless tobacco: Never Used    Tobacco comment: currently 2 cigarettes per day   Substance Use Topics    Alcohol use: No           REVIEW OF SYSTEMS:  all other systems reviewed and are negative  Review of Systems   Constitutional: Negative for chills and fever.    HENT: Negative for congestion, dental problem, ear discharge, ear pain, facial swelling, hearing loss, nosebleeds, postnasal drip, rhinorrhea, sinus pressure, sinus pain, sneezing, sore throat, tinnitus, trouble swallowing and voice change. Eyes: Negative for pain and discharge. Neurological: Negative for dizziness and headaches. Comments:     PHYSICAL EXAM:    /76   Ht 5' 7\" (1.702 m)   Wt 121 lb (54.9 kg)   BMI 18.95 kg/m²   Body mass index is 18.95 kg/m². General Appearance: well developed  and well nourished  Head/ Face: normocephalic and atraumatic  Vocal Quality: good/ normal  Ears: Right Ear: External: external ears normal Otoscopy Ear Canal: canal clear Otoscopy TM: TM's normal and TM's mobile Left Ear: External: external ears normal Otoscopy Ear Canal: canal clear Otoscopy TM: TM's normal and TM's mobile  Hearing: grossly intact  Nose: nares normal and septum midline  Neck: supple and adenopathy none palpable  Thyroid: normal   The pupils were noted to be equal round and reactive to light and accommodation. Patient was noted with positive lateral nystagmus to exam with mild strabismus noted that is asymmetric. Patient was noted to be very anxious with today's exam.    Assessment & Plan:    Problem List Items Addressed This Visit     Pulsatile tinnitus of left ear - Primary     New onset of pulsatile tinnitus to the left ear with vision changes with history of chronic lymphocytic leukemia  Plan: I have recommended MRI of the left IAC for further evaluation. I will also give her samples of Lipo flavonoid for the tinnitus. If the MRI is negative, would recommend a prednisone trial with a Medrol Dosepak. Also could consider potential psychotherapy due to apparent anxiety related to the tinnitus.          Relevant Orders    MRI IAC POSTERIOR FOSSA W WO CONTRAST          Orders Placed This Encounter   Procedures    MRI IAC POSTERIOR FOSSA W WO CONTRAST     Standing Status:   Future     Standing Expiration Date:   4/29/2023     Scheduling Instructions:      LMP     Order Specific Question: STAT Creatinine as needed:     Answer:   No     Order Specific Question:   Reason for exam:     Answer: Worsening tinnitus to the left ear with no history of trauma with normal hearing from audiology screening. Hx. of CLL     Order Specific Question:   What is the sedation requirement? Answer:   None       No orders of the defined types were placed in this encounter. Electronically signed by Charbel Tomlin PA-C on 4/29/22 at 11:15 AM CDT        Please note that this chart was generated using dragon dictation software. Although every effort was made to ensure the accuracy of this automated transcription, some errors in transcription may have occurred.

## 2022-05-09 ENCOUNTER — TELEPHONE (OUTPATIENT)
Dept: ENT CLINIC | Age: 57
End: 2022-05-09

## 2022-05-09 NOTE — TELEPHONE ENCOUNTER
Pt cancelled MRI due to having a CT with contrast per cardio for chest pain. She wants to know if she needs to have contrast with MRI as they did not want them too close together if yes.

## 2022-08-02 NOTE — PROGRESS NOTES
screen: Negative    Noncontrast CT abdomen/pelvis 3/5/2022 at [facility] ([facility]):  STATRAD:  2.5 x 1.8 x 0.7 cm region of skin thickening and subcutaneous stranding in the posterior tissues of the right gluteal region, nonspecific-possibly region of cellulitis  Approximately 6 x 5 x 3 cm poorly defined hypodense region in the posterior right hepatic lobe by the dome. Possibly fatty infiltrated versus hypodense mass. Consider follow-up evaluation with contrast-enhanced CT or MRI of liver  Mild wall thickening throughout the rectum with adjacent fat stranding, suggestive of mild proctitis or colitis  2 mm nonobstructive left renal stone  Scattered, punctate calcified granulomas throughout the liver and spleen    Final read:   Normal appearance to the liver, stomach and spleen   multiple calcified granulomas in the spleen    Clarissa was prescribed Bactrim, though reports nausea and unable to complete. She has had epigastric discomfort and complains of her ribs bilaterally \"crackling\". Discomfort is intermittent, not associated with food or position change. There are no alleviating factors. Clarissa states after review of prior medical records/serology, she was noted to have chronic leukocytosis since 2017. Prior CBCs are unavailable and have been requested for review. She is referred for further evaluation. Clarissa denies night sweats, itching or fever. She has had unexplained weight loss of about 13 pounds since May, 2021. She has epigastric discomfort. She was evaluated for similar complaint at [facility] 6/2021, treated with GI cocktail. She has never had an endoscopy or colonoscopy. Labs 3/21/2022:  FLOW cytometry 3/21/2022 revealed CD5 positive monoclonal B-cell population consistent with B-cell chronic lymphocytic leukemia/small lymphocytic lymphoma  CBC: WBC 13.9 with ANC 3.4, ALC 10.1. Hgb 14.2/MCV 98.8, platelets 557,832.   Hepatitis Panel: non-reactive  HIV: non-reactive  SPEP: normal spep pattern  Immunofixation: No monoclonal proteins seen  Ig, IgA: 114, IgM: 128  Kappa light chain: 17.18, Lambda light chain: 15.51, K/L ratio: 1.11  Sed rate: 3  CRP: <0  LDH: 473    CT Soft Tissue neck with contrast 3/29/2022 at [facility]:   No suspicious adenopathy in the neck. CT Chest with contrast 3/29/2022 at [facility]:   No pathologic intrathoracic or axillary lymphadenopathy. Granulomatous calcification. Linear left upper lobe scarring with no acute consolidation or suspicious nodularity. CT Abdomen pelvis with contrast 3/29/2022 at [facility]:   No evidence of abdominal or pelvic lymphadenopathy. A low-density, nodular enhancing mass in the liver which may represent an atypical hemangioma. Further evaluation with CT scan of the abdomen with liver protocol or MR imaging of the abdomen may be obtained. large low-density mass in the dome of the right lobe posteriorly measuring 4.6 x 5.6 x 4.1 cm which showed nodular interrupted contrast enhancement. Moderately dilated and tortuous gallbladder seen. No gallstones (patient denied abdominal pain, bloating, nausea at f/u on 3/31/2022)  No splenomegaly     MRI liver w/wo contrast was requested 3/31/2022, cancelled by patient. Recommend bone marrow aspirate and biopsy. Canceled by patient. Suspect Giuliana Pippins will likely not need treatment, observation only. Final decisions pending BMAB. BMAB cancelled by patient. She verbalizes desire for conservative monitoring at follow-up apt 8/3/2022. Past Medical History:   Diagnosis Date    GERD (gastroesophageal reflux disease)     Hypothyroidism     Rib fracture     accidental fall    Tobacco use     Viral arthritis ([facility] [address]      Past Surgical History:   Procedure Laterality Date     SECTION      3       Current Outpatient Medications:     levothyroxine (SYNTHROID) 75 MCG tablet, Take 75 mcg by mouth daily, Disp: , Rfl:    Allergies:    Allergies   Allergen Reactions    Cephalexin Hives    Codeine Hives    Ibuprofen      Other reaction(s): Unknown    Promethazine Hives    Wellbutrin [Bupropion Hcl]      Social History     Tobacco Use    Smoking status: Every Day     Packs/day: 0.50     Types: Cigarettes    Smokeless tobacco: Never    Tobacco comments:     currently 2 cigarettes per day   Substance Use Topics    Alcohol use: No    Drug use: No     Family History   Problem Relation Age of Onset    Lung Cancer Maternal Grandmother      Health Maintenance   Topic Date Due    Pneumococcal 0-64 years Vaccine (1 - PCV) Never done    Depression Screen  Never done    Shingles vaccine (1 of 2) Never done    Cervical cancer screen  Never done    Lipids  Never done    Colorectal Cancer Screen  Never done    Breast cancer screen  Never done    COVID-19 Vaccine (3 - Moderna risk series) 04/21/2021    Flu vaccine (1) 09/01/2022    DTaP/Tdap/Td vaccine (2 - Td or Tdap) 12/13/2023    Hepatitis C screen  Completed    HIV screen  Completed    Hepatitis A vaccine  Aged Out    Hepatitis B vaccine  Aged Out    Hib vaccine  Aged Out    Meningococcal (ACWY) vaccine  Aged Out     Subjective   Review of Systems   Constitutional:  Negative for fatigue and fever. HENT:  Positive for tinnitus (right > left). Negative for dental problem, hearing loss, mouth sores, nosebleeds, sore throat and trouble swallowing. Eyes:  Negative for discharge and itching. Respiratory:  Negative for cough, shortness of breath and wheezing. Cardiovascular:  Negative for chest pain, palpitations and leg swelling. Gastrointestinal:  Negative for abdominal pain, constipation, diarrhea, nausea and vomiting. Endocrine: Negative for cold intolerance and heat intolerance. Genitourinary:  Negative for dysuria, frequency, hematuria and urgency. Musculoskeletal:  Negative for arthralgias, joint swelling and myalgias. Skin:  Negative for pallor and rash. Allergic/Immunologic: Negative for environmental allergies and immunocompromised state. Neurological:  Negative for seizures, syncope and numbness. Hematological:  Negative for adenopathy. Does not bruise/bleed easily. Psychiatric/Behavioral:  Negative for agitation, behavioral problems and confusion. The patient is not nervous/anxious. Objective   Physical Exam  Vitals reviewed. Constitutional:       General: She is not in acute distress. Appearance: She is well-developed. She is not toxic-appearing or diaphoretic. Comments: Wearing a facial mask. thin   HENT:      Head: Normocephalic and atraumatic. Right Ear: External ear normal.      Left Ear: External ear normal.      Nose: Nose normal.      Mouth/Throat:      Mouth: Mucous membranes are moist.   Eyes:      General: No scleral icterus. Right eye: No discharge. Left eye: No discharge. Conjunctiva/sclera: Conjunctivae normal.   Neck:      Trachea: No tracheal deviation. Cardiovascular:      Rate and Rhythm: Normal rate and regular rhythm. Pulmonary:      Effort: Pulmonary effort is normal. No respiratory distress. Breath sounds: Normal breath sounds. No wheezing or rales. Chest:   Breasts:     Right: No axillary adenopathy or supraclavicular adenopathy. Left: No axillary adenopathy or supraclavicular adenopathy. Abdominal:      General: Bowel sounds are normal. There is no distension. Palpations: Abdomen is soft. Tenderness: There is no guarding. Genitourinary:     Comments: Exam deferred  Musculoskeletal:         General: No tenderness or deformity. Cervical back: Neck supple. No muscular tenderness. Comments: Normal ROM all four extremities   Lymphadenopathy:      Cervical:      Right cervical: No superficial or deep cervical adenopathy. Left cervical: No superficial or deep cervical adenopathy. Upper Body:      Right upper body: No supraclavicular or axillary adenopathy. Left upper body: No supraclavicular or axillary adenopathy.       Comments: Skin:     General: Skin is warm and dry. Findings: No rash. Neurological:      Mental Status: She is alert and oriented to person, place, and time. Comments: follows commands, non-focal   Psychiatric:         Behavior: Behavior normal. Behavior is cooperative. Thought Content: Thought content normal.         Judgment: Judgment normal.      Comments: Alert and oriented to person, place and time. /70   Pulse 76   Wt 122 lb (55.3 kg)   SpO2 99%   BMI 19.11 kg/m²   Wt Readings from Last 3 Encounters:   08/03/22 122 lb (55.3 kg)   04/29/22 121 lb (54.9 kg)   03/31/22 121 lb 12.8 oz (55.2 kg)     Labs reviewed by me:  CBC 08/03/22:   Lab Results   Component Value Date    WBC 14.78 (H) 08/03/2022    HGB 14.6 08/03/2022    HCT 44.6 08/03/2022    .7 (H) 08/03/2022     (L) 08/03/2022    LYMPHOPCT 67.2 (H) 08/03/2022    RBC 4.26 08/03/2022    MCH 34.3 (H) 08/03/2022    MCHC 32.7 08/03/2022    RDW 12.2 08/03/2022     ASSESSMENT/PLAN:    No diagnosis found. CLL/SLL    FLOW cytometry 3/21/2022 revealed CD5 positive monoclonal B-cell population consistent with B-cell chronic lymphocytic leukemia/small lymphocytic lymphoma    BMAB recommended, canceled by patient    Contrasted CT Soft Tissue neck, chest, abdomen/pelvis 3/29/2022 at [facility]: No adenopathies  A low-density, nodular enhancing mass in the liver which may represent an atypical hemangioma. Further evaluation with CT scan of the abdomen with liver protocol or MR imaging of the abdomen may be obtained. large low-density mass in the dome of the right lobe posteriorly measuring 4.6 x 5.6 x 4.1 cm which showed nodular interrupted contrast enhancement. MRI liver w/wo contrast requested, canceled by patient    CBC via fingerstick: WBC 14.78/ALC 9.93. Hgb 14.6/.7, platelets 922,949. CBC repeat peripheral draw: WBC 14.67, ALC 9.08. Hgb 14.7/101.6, platelets 071,562. Leukocytosis is mild. She has no B symptoms.   CT scans soft tissue neck, chest, abdomen/pelvis are negative for lymphadenopathy. There is no splenomegaly. Patient desires conservative monitoring and BMAB if counts worsen or symptoms arise. This is reasonable. Will monitor CBC periodically. Abnormal finding on imaging of the liver  large low-density mass in the dome of the right lobe posteriorly measuring 4.6 x 5.6 x 4.1 cm which showed nodular interrupted contrast enhancement noted on CT abd/pelvis  MRI abdomen, attention liver, requested for further clarification, canceled by patient    No orders of the defined types were placed in this encounter. Repeat CBC in 2 months. Return in about 4 months (around 12/3/2022) for follow up with MELO Juarez. I have seen, examined and reviewed this patient medication list, appropriate labs and imaging studies. I reviewed relevant medical records and others physicians notes. I discussed the plan of care with the patient. I answered all questions to the patients satisfaction. I have also reviewed the chief complaint (CC) and part of the history (History of Present Illness (HPI), Past Family Social History [facility]), or Review of Systems (ROS) and made changes when appropriated. Office note from Dannie Rosa regarding tinnitus reviewed. Dictated utilizing Dragon transcription software.           MELO Tineo  2:27 PM  8/3/2022

## 2022-08-03 ENCOUNTER — OFFICE VISIT (OUTPATIENT)
Dept: HEMATOLOGY | Age: 57
End: 2022-08-03
Payer: COMMERCIAL

## 2022-08-03 ENCOUNTER — HOSPITAL ENCOUNTER (OUTPATIENT)
Dept: INFUSION THERAPY | Age: 57
Discharge: HOME | End: 2022-08-03
Payer: COMMERCIAL

## 2022-08-03 VITALS
BODY MASS INDEX: 19.11 KG/M2 | OXYGEN SATURATION: 99 % | DIASTOLIC BLOOD PRESSURE: 70 MMHG | SYSTOLIC BLOOD PRESSURE: 118 MMHG | HEART RATE: 76 BPM | WEIGHT: 122 LBS

## 2022-08-03 DIAGNOSIS — R93.2 ABNORMAL FINDING ON IMAGING OF LIVER: ICD-10-CM

## 2022-08-03 DIAGNOSIS — C91.10 CLL (CHRONIC LYMPHOCYTIC LEUKEMIA) (HCC): Primary | ICD-10-CM

## 2022-08-03 DIAGNOSIS — D72.820 LYMPHOCYTOSIS: ICD-10-CM

## 2022-08-03 LAB
BASOPHILS ABSOLUTE: 0.04 K/UL (ref 0.01–0.08)
BASOPHILS RELATIVE PERCENT: 0.3 % (ref 0.1–1.2)
EOSINOPHILS ABSOLUTE: 0 K/UL (ref 0.04–0.54)
EOSINOPHILS RELATIVE PERCENT: 0 % (ref 0.7–7)
HCT VFR BLD CALC: 44.6 % (ref 34.1–44.9)
HEMOGLOBIN: 14.6 G/DL (ref 11.2–15.7)
LYMPHOCYTES ABSOLUTE: 9.93 K/UL (ref 1.18–3.74)
LYMPHOCYTES RELATIVE PERCENT: 67.2 % (ref 19.3–53.1)
MCH RBC QN AUTO: 34.3 PG (ref 25.6–32.2)
MCHC RBC AUTO-ENTMCNC: 32.7 G/DL (ref 32.3–35.5)
MCV RBC AUTO: 104.7 FL (ref 79.4–94.8)
MONOCYTES ABSOLUTE: 1.3 K/UL (ref 0.24–0.82)
MONOCYTES RELATIVE PERCENT: 8.8 % (ref 4.7–12.5)
NEUTROPHILS ABSOLUTE: 3.49 K/UL (ref 1.56–6.13)
NEUTROPHILS RELATIVE PERCENT: 23.6 % (ref 34–71.1)
PDW BLD-RTO: 12.2 % (ref 11.7–14.4)
PLATELET # BLD: 117 K/UL (ref 182–369)
PMV BLD AUTO: 10.4 FL (ref 7.4–10.4)
RBC # BLD: 4.26 M/UL (ref 3.93–5.22)
WBC # BLD: 14.78 K/UL (ref 3.98–10.04)

## 2022-08-03 PROCEDURE — 99211 OFF/OP EST MAY X REQ PHY/QHP: CPT

## 2022-08-03 PROCEDURE — 85025 COMPLETE CBC W/AUTO DIFF WBC: CPT

## 2022-08-03 PROCEDURE — 99213 OFFICE O/P EST LOW 20 MIN: CPT | Performed by: NURSE PRACTITIONER

## 2022-08-03 ASSESSMENT — ENCOUNTER SYMPTOMS
NAUSEA: 0
SORE THROAT: 0
DIARRHEA: 0
CONSTIPATION: 0
SHORTNESS OF BREATH: 0
EYE DISCHARGE: 0
VOMITING: 0
COUGH: 0
ABDOMINAL PAIN: 0
WHEEZING: 0
EYE ITCHING: 0
TROUBLE SWALLOWING: 0

## 2022-09-28 ENCOUNTER — APPOINTMENT (OUTPATIENT)
Dept: INFUSION THERAPY | Age: 57
End: 2022-09-28
Payer: COMMERCIAL

## 2022-09-29 ENCOUNTER — APPOINTMENT (OUTPATIENT)
Dept: INFUSION THERAPY | Age: 57
End: 2022-09-29
Payer: COMMERCIAL

## 2022-10-10 ENCOUNTER — HOSPITAL ENCOUNTER (OUTPATIENT)
Dept: INFUSION THERAPY | Age: 57
Discharge: HOME | End: 2022-10-10
Payer: COMMERCIAL

## 2022-10-10 DIAGNOSIS — D72.820 LYMPHOCYTOSIS: ICD-10-CM

## 2022-10-10 LAB
BASOPHILS ABSOLUTE: 0.04 K/UL (ref 0.01–0.08)
BASOPHILS RELATIVE PERCENT: 0.3 % (ref 0.1–1.2)
EOSINOPHILS ABSOLUTE: 0.02 K/UL (ref 0.04–0.54)
EOSINOPHILS RELATIVE PERCENT: 0.1 % (ref 0.7–7)
HCT VFR BLD CALC: 42.9 % (ref 34.1–44.9)
HEMOGLOBIN: 14.3 G/DL (ref 11.2–15.7)
LYMPHOCYTES ABSOLUTE: 10.53 K/UL (ref 1.18–3.74)
LYMPHOCYTES RELATIVE PERCENT: 65.9 % (ref 19.3–53.1)
MCH RBC QN AUTO: 34.7 PG (ref 25.6–32.2)
MCHC RBC AUTO-ENTMCNC: 33.3 G/DL (ref 32.3–35.5)
MCV RBC AUTO: 104.1 FL (ref 79.4–94.8)
MONOCYTES ABSOLUTE: 1.74 K/UL (ref 0.24–0.82)
MONOCYTES RELATIVE PERCENT: 10.9 % (ref 4.7–12.5)
NEUTROPHILS ABSOLUTE: 3.62 K/UL (ref 1.56–6.13)
NEUTROPHILS RELATIVE PERCENT: 22.6 % (ref 34–71.1)
PDW BLD-RTO: 12.3 % (ref 11.7–14.4)
PLATELET # BLD: 124 K/UL (ref 182–369)
PMV BLD AUTO: 10.1 FL (ref 7.4–10.4)
RBC # BLD: 4.12 M/UL (ref 3.93–5.22)
WBC # BLD: 15.98 K/UL (ref 3.98–10.04)

## 2022-10-10 PROCEDURE — 85025 COMPLETE CBC W/AUTO DIFF WBC: CPT

## 2022-10-10 PROCEDURE — 36415 COLL VENOUS BLD VENIPUNCTURE: CPT

## 2022-12-06 NOTE — PROGRESS NOTES
OP HEMATOLOGY/ONCOLOGY PROGRESS NOTE      Pt Name: Miguel Angel Ayala  YOB: 1965  MRN: [id number]  PCP: MELO Cesar  Date of evaluation: 12/07/2022    History Obtained From:  patient, electronic medical record    CHIEF COMPLAINT:    Chief Complaint   Patient presents with    Follow-up     CLL (chronic lymphocytic leukemia) ([facility] [address]     HISTORY OF PRESENT ILLNESS:    Taurus Parker is a 62 y.o.  female returning to the clinic for hematology follow-up regarding her diagnosis of CLL made 3/21/2022 via peripheral blood for FLOW cytometry. WBC is 16.64 today, ALC 9.84. CT scans of the soft tissue neck, chest, abdomen/pelvis were negative for lymphadenopathy. Tati Gomez denies B symptoms. HEMATOLOGY HISTORY: CLL/SLL, 3/21/2022  Tati Gomez was seen in hematology consultation 3/21/2022, referred by MELO Parkinson for evaluation of lymphocytosis. Patient was seen in urgent care 3/4/2022 for evaluation of sore on left hip. Patient reports not tolerating Bactrim as prescribed. WBC was reported at 26,000. She was prescribed Bactrim, unable to tolerate due to nausea. She was evaluated at [facility]) 3/5/2022 with the following findings:    Labs 3/5/2022:  CBC: WBC: 17.8, Hgb: 15.3/ MCV: 96, Hct: 43.5, Platelets: 430, Absolute lymphocytes: 10.1, Absolute monocytes: 2.06, Eosinophils: 0.5  CMP: Creatinine 0.7/GFR 92, total protein 8.5 (6.3-8.2), calcium 10.1  Amylase: 90  Lipase: 145, normal  UA: Negative nitrite, trace blood  COVID-19 screen: Negative    Noncontrast CT abdomen/pelvis 3/5/2022 at [facility]):  STATRAD:  2.5 x 1.8 x 0.7 cm region of skin thickening and subcutaneous stranding in the posterior tissues of the right gluteal region, nonspecific-possibly region of cellulitis  Approximately 6 x 5 x 3 cm poorly defined hypodense region in the posterior right hepatic lobe by the dome. Possibly fatty infiltrated versus hypodense mass.   Consider follow-up evaluation with contrast-enhanced CT or MRI of liver  Mild wall thickening throughout the rectum with adjacent fat stranding, suggestive of mild proctitis or colitis  2 mm nonobstructive left renal stone  Scattered, punctate calcified granulomas throughout the liver and spleen    Final read:   Normal appearance to the liver, stomach and spleen   multiple calcified granulomas in the spleen    Zaid Mon was prescribed Bactrim, though reports nausea and unable to complete. She has had epigastric discomfort and complains of her ribs bilaterally \"crackling\". Discomfort is intermittent, not associated with food or position change. There are no alleviating factors. Zaid Mon states after review of prior medical records/serology, she was noted to have chronic leukocytosis since 2017. Prior CBCs are unavailable and have been requested for review. She is referred for further evaluation. Zaid Mon denies night sweats, itching or fever. She has had unexplained weight loss of about 13 pounds since May, 2021. She has epigastric discomfort. She was evaluated for similar complaint at [facility] 2021, treated with GI cocktail. She has never had an endoscopy or colonoscopy. Labs 3/21/2022:  FLOW cytometry 3/21/2022 revealed CD5 positive monoclonal B-cell population consistent with B-cell chronic lymphocytic leukemia/small lymphocytic lymphoma  CBC: WBC 13.9 with ANC 3.4, ALC 10.1. Hgb 14.2/MCV 98.8, platelets 560,945. Hepatitis Panel: non-reactive  HIV: non-reactive  SPEP: normal spep pattern  Immunofixation: No monoclonal proteins seen  Ig, IgA: 114, IgM: 128  Kappa light chain: 17.18, Lambda light chain: 15.51, K/L ratio: 1.11  Sed rate: 3  CRP: <0  LDH: 473    CT Soft Tissue neck with contrast 3/29/2022 at [facility]:   No suspicious adenopathy in the neck. CT Chest with contrast 3/29/2022 at [facility]:   No pathologic intrathoracic or axillary lymphadenopathy. Granulomatous calcification.  Linear left upper lobe scarring with no acute consolidation or suspicious nodularity. CT Abdomen pelvis with contrast 3/29/2022 at [facility]:   No evidence of abdominal or pelvic lymphadenopathy. A low-density, nodular enhancing mass in the liver which may represent an atypical hemangioma. Further evaluation with CT scan of the abdomen with liver protocol or MR imaging of the abdomen may be obtained. large low-density mass in the dome of the right lobe posteriorly measuring 4.6 x 5.6 x 4.1 cm which showed nodular interrupted contrast enhancement. Moderately dilated and tortuous gallbladder seen. No gallstones (patient denied abdominal pain, bloating, nausea at f/u on 3/31/2022)  No splenomegaly     MRI liver w/wo contrast was requested 3/31/2022, cancelled by patient. Recommend bone marrow aspirate and biopsy. Canceled by patient. Suspect Taj Wade will likely not need treatment, observation only. Final decisions pending BMAB. BMAB cancelled by patient. She verbalizes desire for conservative monitoring at follow-up apt 8/3/2022. Past Medical History:   Diagnosis Date    GERD (gastroesophageal reflux disease)     Hypothyroidism     Rib fracture     accidental fall    Tobacco use     Viral arthritis ([facility] [address]      Past Surgical History:   Procedure Laterality Date     SECTION      3       Current Outpatient Medications:     levothyroxine (SYNTHROID) 75 MCG tablet, Take 75 mcg by mouth daily, Disp: , Rfl:    Allergies:    Allergies   Allergen Reactions    Cephalexin Hives    Codeine Hives    Ibuprofen      Other reaction(s): Unknown    Promethazine Hives    Wellbutrin [Bupropion Hcl]      Social History     Tobacco Use    Smoking status: Every Day     Packs/day: 0.50     Types: Cigarettes    Smokeless tobacco: Never    Tobacco comments:     currently 2 cigarettes per day   Substance Use Topics    Alcohol use: No    Drug use: No     Family History   Problem Relation Age of Onset    Lung Cancer Maternal Grandmother Health Maintenance   Topic Date Due    Pneumococcal 0-64 years Vaccine (1 - PCV) Never done    Depression Screen  Never done    Shingles vaccine (1 of 2) Never done    Cervical cancer screen  Never done    Lipids  Never done    Colorectal Cancer Screen  Never done    Breast cancer screen  Never done    COVID-19 Vaccine (3 - Moderna risk series) 04/21/2021    Flu vaccine (1) Never done    DTaP/Tdap/Td vaccine (2 - Td or Tdap) 12/13/2023    Hepatitis C screen  Completed    HIV screen  Completed    Hepatitis A vaccine  Aged Out    Hib vaccine  Aged Out    Meningococcal (ACWY) vaccine  Aged Out     Subjective   Review of Systems   Constitutional:  Negative for fatigue and fever. HENT:  Negative for dental problem, hearing loss, mouth sores, nosebleeds, sore throat and trouble swallowing. Eyes:  Negative for discharge and itching. Respiratory:  Negative for cough, shortness of breath and wheezing. Cardiovascular:  Negative for chest pain, palpitations and leg swelling. Gastrointestinal:  Negative for abdominal pain, constipation, diarrhea, nausea and vomiting. Endocrine: Negative for cold intolerance and heat intolerance. Genitourinary:  Negative for dysuria, frequency, hematuria and urgency. Musculoskeletal:  Negative for arthralgias, joint swelling and myalgias. Skin:  Negative for pallor and rash. Allergic/Immunologic: Negative for environmental allergies and immunocompromised state. Neurological:  Negative for seizures, syncope and numbness. Hematological:  Negative for adenopathy. Does not bruise/bleed easily. Psychiatric/Behavioral:  Negative for agitation, behavioral problems and confusion. The patient is not nervous/anxious. Objective   Physical Exam  Vitals reviewed. Constitutional:       General: She is not in acute distress. Appearance: She is well-developed. She is not toxic-appearing or diaphoretic. Comments: thin   HENT:      Head: Normocephalic and atraumatic. Right Ear: External ear normal.      Left Ear: External ear normal.      Nose: Nose normal.      Mouth/Throat:      Mouth: Mucous membranes are moist.   Eyes:      General: No scleral icterus. Right eye: No discharge. Left eye: No discharge. Conjunctiva/sclera: Conjunctivae normal.   Neck:      Trachea: No tracheal deviation. Cardiovascular:      Rate and Rhythm: Normal rate and regular rhythm. Pulmonary:      Effort: Pulmonary effort is normal. No respiratory distress. Breath sounds: Normal breath sounds. No wheezing or rales. Abdominal:      General: Bowel sounds are normal. There is no distension. Palpations: Abdomen is soft. Tenderness: There is no guarding. Genitourinary:     Comments: Exam deferred  Musculoskeletal:         General: No tenderness or deformity. Cervical back: Neck supple. No muscular tenderness. Comments: Normal ROM all four extremities   Lymphadenopathy:      Cervical:      Right cervical: No superficial or deep cervical adenopathy. Left cervical: No superficial or deep cervical adenopathy. Upper Body:      Right upper body: No supraclavicular or axillary adenopathy. Left upper body: No supraclavicular or axillary adenopathy. Comments:      Skin:     General: Skin is warm and dry. Findings: No rash. Neurological:      Mental Status: She is alert and oriented to person, place, and time. Comments: follows commands, non-focal   Psychiatric:         Behavior: Behavior normal. Behavior is cooperative. Thought Content: Thought content normal.         Judgment: Judgment normal.      Comments: Alert and oriented to person, place and time.      /76   Pulse 78   Ht 5' 7\" (1.702 m)   Wt 123 lb 9.6 oz (56.1 kg)   SpO2 100%   BMI 19.36 kg/m²   Wt Readings from Last 3 Encounters:   12/07/22 123 lb 9.6 oz (56.1 kg)   08/03/22 122 lb (55.3 kg)   04/29/22 121 lb (54.9 kg)     Labs reviewed by me:  CBC 12/11/22:   Lab Results   Component Value Date    WBC 16.64 (H) 12/07/2022    HGB 14.0 12/07/2022    HCT 40.8 12/07/2022    MCV 98.6 (H) 12/07/2022     (L) 12/07/2022    LYMPHOPCT 59.1 (H) 12/07/2022    RBC 4.14 12/07/2022    MCH 33.8 (H) 12/07/2022    MCHC 34.3 12/07/2022    RDW 11.9 12/07/2022         ASSESSMENT/PLAN:    1. CLL (chronic lymphocytic leukemia) ([facility] [address]    2. Abnormal finding on imaging of liver    3. Care plan discussed with patient         CLL/SLL    FLOW cytometry 3/21/2022 revealed CD5 positive monoclonal B-cell population consistent with B-cell chronic lymphocytic leukemia/small lymphocytic lymphoma    BMAB previously recommended, canceled by patient due to conflicting schedule    CBC today includes a WBC of 16.64, ALC 9.84. Hgb 14.0 with MCV 98.6, platelets 980,481    Leukocytosis is mild. Will reschedule nonurgent Bone marrow aspirate and biopsy for baseline evaluation, after the holidays      Contrasted CT Soft Tissue neck, chest, abdomen/pelvis 3/29/2022 at [facility]: No adenopathies  Abnormal finding on imaging of the liver  A low-density, nodular enhancing mass in the liver which may represent an atypical hemangioma. Further evaluation with CT scan of the abdomen with liver protocol or MR imaging of the abdomen may be obtained. large low-density mass in the dome of the right lobe posteriorly measuring 4.6 x 5.6 x 4.1 cm which showed nodular interrupted contrast enhancement. MRI liver w/wo contrast requested, canceled by patient also due to conflicting schedule  Will reschedule at this time      Orders Placed This Encounter   Procedures    MRI ABDOMEN W WO CONTRAST     Return in about 2 weeks (around 12/21/2022) for follow up with MELO Vela (2 weeks after BMAB to discuss results). I have seen, examined and reviewed this patient medication list, appropriate labs and imaging studies. I reviewed relevant medical records and others physicians notes.  I discussed the plan of care with the patient. I answered all questions to the patients satisfaction. I have also reviewed the chief complaint (CC) and part of the history (History of Present Illness (HPI), Past Family Social History [facility]), or Review of Systems (ROS) and made changes when appropriate. Dictated utilizing Dragon transcription software.           MELO Olivier  8:33 PM  12/11/2022

## 2022-12-07 ENCOUNTER — HOSPITAL ENCOUNTER (OUTPATIENT)
Dept: INFUSION THERAPY | Age: 57
Discharge: HOME | End: 2022-12-07
Payer: COMMERCIAL

## 2022-12-07 ENCOUNTER — TELEPHONE (OUTPATIENT)
Dept: HEMATOLOGY | Age: 57
End: 2022-12-07

## 2022-12-07 ENCOUNTER — OFFICE VISIT (OUTPATIENT)
Dept: HEMATOLOGY | Age: 57
End: 2022-12-07
Payer: COMMERCIAL

## 2022-12-07 VITALS
HEIGHT: 67 IN | OXYGEN SATURATION: 100 % | DIASTOLIC BLOOD PRESSURE: 76 MMHG | BODY MASS INDEX: 19.4 KG/M2 | WEIGHT: 123.6 LBS | SYSTOLIC BLOOD PRESSURE: 124 MMHG | HEART RATE: 78 BPM

## 2022-12-07 DIAGNOSIS — D72.820 LYMPHOCYTOSIS: ICD-10-CM

## 2022-12-07 DIAGNOSIS — C91.10 CLL (CHRONIC LYMPHOCYTIC LEUKEMIA) (HCC): Primary | ICD-10-CM

## 2022-12-07 DIAGNOSIS — R93.2 ABNORMAL FINDING ON IMAGING OF LIVER: ICD-10-CM

## 2022-12-07 DIAGNOSIS — Z71.89 CARE PLAN DISCUSSED WITH PATIENT: ICD-10-CM

## 2022-12-07 LAB
BASOPHILS ABSOLUTE: 0.03 K/UL (ref 0.01–0.08)
BASOPHILS RELATIVE PERCENT: 0.2 % (ref 0.1–1.2)
EOSINOPHILS ABSOLUTE: 0.01 K/UL (ref 0.04–0.54)
EOSINOPHILS RELATIVE PERCENT: 0.1 % (ref 0.7–7)
HCT VFR BLD CALC: 40.8 % (ref 34.1–44.9)
HEMOGLOBIN: 14 G/DL (ref 11.2–15.7)
LYMPHOCYTES ABSOLUTE: 9.84 K/UL (ref 1.18–3.74)
LYMPHOCYTES RELATIVE PERCENT: 59.1 % (ref 19.3–53.1)
MCH RBC QN AUTO: 33.8 PG (ref 25.6–32.2)
MCHC RBC AUTO-ENTMCNC: 34.3 G/DL (ref 32.3–35.5)
MCV RBC AUTO: 98.6 FL (ref 79.4–94.8)
MONOCYTES ABSOLUTE: 2.51 K/UL (ref 0.24–0.82)
MONOCYTES RELATIVE PERCENT: 15.1 % (ref 4.7–12.5)
NEUTROPHILS ABSOLUTE: 4.22 K/UL (ref 1.56–6.13)
NEUTROPHILS RELATIVE PERCENT: 25.3 % (ref 34–71.1)
PDW BLD-RTO: 11.9 % (ref 11.7–14.4)
PLATELET # BLD: 113 K/UL (ref 182–369)
PMV BLD AUTO: 10.1 FL (ref 7.4–10.4)
RBC # BLD: 4.14 M/UL (ref 3.93–5.22)
WBC # BLD: 16.64 K/UL (ref 3.98–10.04)

## 2022-12-07 PROCEDURE — 85025 COMPLETE CBC W/AUTO DIFF WBC: CPT

## 2022-12-07 PROCEDURE — 99212 OFFICE O/P EST SF 10 MIN: CPT

## 2022-12-07 PROCEDURE — 99213 OFFICE O/P EST LOW 20 MIN: CPT | Performed by: NURSE PRACTITIONER

## 2022-12-07 PROCEDURE — 36415 COLL VENOUS BLD VENIPUNCTURE: CPT

## 2022-12-07 NOTE — TELEPHONE ENCOUNTER
----- Message from Mishel Guy CNP sent at 6/22/2020 12:40 PM CDT -----  Please call patient: Urine culture with bacterial growth of E.coli, continue and complete antibiotic, if symptoms persist or return will need to follow-up in office.   Patient stated that she would call us to make the appointments for the procedures ordered by MELO Maldonado  on 12/7/2022

## 2022-12-11 ASSESSMENT — ENCOUNTER SYMPTOMS
SORE THROAT: 0
COUGH: 0
WHEEZING: 0
SHORTNESS OF BREATH: 0
DIARRHEA: 0
TROUBLE SWALLOWING: 0
EYE ITCHING: 0
ABDOMINAL PAIN: 0
VOMITING: 0
NAUSEA: 0
EYE DISCHARGE: 0
CONSTIPATION: 0

## 2022-12-15 ENCOUNTER — TELEPHONE (OUTPATIENT)
Dept: HEMATOLOGY | Age: 57
End: 2022-12-15

## 2022-12-15 NOTE — TELEPHONE ENCOUNTER
Called patient to verify if she was ready to schedule bone marrow or MRI , patient advised she will call us after jaiden to schedule.